# Patient Record
Sex: MALE | Race: WHITE | NOT HISPANIC OR LATINO | Employment: FULL TIME | URBAN - METROPOLITAN AREA
[De-identification: names, ages, dates, MRNs, and addresses within clinical notes are randomized per-mention and may not be internally consistent; named-entity substitution may affect disease eponyms.]

---

## 2019-04-05 ENCOUNTER — OFFICE VISIT (OUTPATIENT)
Dept: URGENT CARE | Facility: PHYSICIAN GROUP | Age: 24
End: 2019-04-05
Payer: COMMERCIAL

## 2019-04-05 VITALS
WEIGHT: 270 LBS | RESPIRATION RATE: 15 BRPM | DIASTOLIC BLOOD PRESSURE: 82 MMHG | HEART RATE: 122 BPM | TEMPERATURE: 98.9 F | SYSTOLIC BLOOD PRESSURE: 134 MMHG | BODY MASS INDEX: 36.57 KG/M2 | OXYGEN SATURATION: 94 % | HEIGHT: 72 IN

## 2019-04-05 DIAGNOSIS — R52 BODY ACHES: ICD-10-CM

## 2019-04-05 DIAGNOSIS — M54.5 LOW BACK PAIN, UNSPECIFIED BACK PAIN LATERALITY, UNSPECIFIED CHRONICITY, WITH SCIATICA PRESENCE UNSPECIFIED: ICD-10-CM

## 2019-04-05 LAB
APPEARANCE UR: CLEAR
BILIRUB UR STRIP-MCNC: NEGATIVE MG/DL
COLOR UR AUTO: YELLOW
FLUAV+FLUBV AG SPEC QL IA: NEGATIVE
GLUCOSE UR STRIP.AUTO-MCNC: NEGATIVE MG/DL
INT CON NEG: NEGATIVE
INT CON POS: POSITIVE
KETONES UR STRIP.AUTO-MCNC: NEGATIVE MG/DL
LEUKOCYTE ESTERASE UR QL STRIP.AUTO: NEGATIVE
NITRITE UR QL STRIP.AUTO: NEGATIVE
PH UR STRIP.AUTO: 5.5 [PH] (ref 5–8)
PROT UR QL STRIP: NORMAL MG/DL
RBC UR QL AUTO: NORMAL
SP GR UR STRIP.AUTO: 1.02
UROBILINOGEN UR STRIP-MCNC: 1 MG/DL

## 2019-04-05 PROCEDURE — 87804 INFLUENZA ASSAY W/OPTIC: CPT | Performed by: PHYSICIAN ASSISTANT

## 2019-04-05 PROCEDURE — 99203 OFFICE O/P NEW LOW 30 MIN: CPT | Performed by: PHYSICIAN ASSISTANT

## 2019-04-05 PROCEDURE — 81002 URINALYSIS NONAUTO W/O SCOPE: CPT | Performed by: PHYSICIAN ASSISTANT

## 2019-04-05 ASSESSMENT — ENCOUNTER SYMPTOMS
FALLS: 0
COUGH: 0
BACK PAIN: 1
CHANGE IN BOWEL HABIT: 0
ABDOMINAL PAIN: 1
WHEEZING: 0
MYALGIAS: 1
VOMITING: 0
HEADACHES: 1
NAUSEA: 1
DIARRHEA: 0
EYE DISCHARGE: 0
FLANK PAIN: 1
CHILLS: 1
FEVER: 0
EYE REDNESS: 0
SORE THROAT: 0
FATIGUE: 1

## 2019-04-05 NOTE — PROGRESS NOTES
Subjective:      Eyad Weber is a 24 y.o. male who presents with Other (knot in stomach that has moved to back, headache , chillis started yesterday )             Patient is a pleasant 24-year-old male who presents to urgent care with a symptoms of lower back pain, body aches, recent headache, fatigue for the last day.  Patient reports yesterday he developed right-sided rib and upper abdominal pain with his back pain however this since has resolved.  Patient reports taking Excedrin with mild relief of his headache.  He denies any fevers but does report body aches.      Other    This is a new problem. The current episode started yesterday. The problem occurs constantly. The problem has been waxing and waning. Associated symptoms include abdominal pain, chills, fatigue, headaches, myalgias and nausea. Pertinent negatives include no change in bowel habit, congestion, coughing, fever, rash, sore throat or vomiting.  Nothing aggravates the symptoms. Treatments tried: As above.  The treatment provided mild relief.   Denies any fall, trauma or injury.    Review of Systems   Constitutional: Positive for chills, fatigue and malaise/fatigue. Negative for fever.   HENT: Negative for congestion and sore throat.    Eyes: Negative for discharge and redness.   Respiratory: Negative for cough and wheezing.    Gastrointestinal: Positive for abdominal pain and nausea. Negative for change in bowel habit, diarrhea and vomiting.   Genitourinary: Positive for flank pain. Negative for dysuria, frequency, hematuria and urgency.   Musculoskeletal: Positive for back pain and myalgias. Negative for falls.   Skin: Negative for rash.   Neurological: Positive for headaches.          Objective:     /82   Pulse (!) 122   Temp 37.2 °C (98.9 °F) (Temporal)   Resp 15   Ht 1.829 m (6')   Wt 122.5 kg (270 lb)   SpO2 94%   BMI 36.62 kg/m²     PMH:  has no past medical history on file.  MEDS: No current outpatient prescriptions on  file.  ALLERGIES: No Known Allergies  SURGHX: No past surgical history on file.  SOCHX:  reports that he has never smoked. He has never used smokeless tobacco. He reports that he does not drink alcohol or use drugs.  FH: Family history was reviewed, no pertinent findings to report    Physical Exam   Constitutional: He is oriented to person, place, and time. He appears well-developed and well-nourished. No distress.   HENT:   Head: Normocephalic and atraumatic.   Right Ear: External ear normal.   Left Ear: External ear normal.   Mouth/Throat: Oropharynx is clear and moist. No oropharyngeal exudate.   Eyes: Pupils are equal, round, and reactive to light. Conjunctivae and EOM are normal.   Neck: Normal range of motion. Neck supple. No tracheal deviation present.   Cardiovascular: Normal rate and regular rhythm.    No murmur heard.  Slower on examination today.   Pulmonary/Chest: Effort normal and breath sounds normal. No respiratory distress.   Abdominal: Soft. Bowel sounds are normal. There is no tenderness.   Musculoskeletal: He exhibits no edema.        Lumbar back: He exhibits tenderness. He exhibits normal range of motion, no bony tenderness and no swelling.        Back:    Neurological: He is alert and oriented to person, place, and time. Coordination normal.   Skin: Skin is warm. No rash noted.   Psychiatric: He has a normal mood and affect. His behavior is normal. Judgment and thought content normal.   Vitals reviewed.            Negative influenza  UA- trace blood.     Assessment/Plan:     1. Body aches  - POCT Influenza A/B  - POCT Urinalysis    2. Low back pain, unspecified back pain laterality, unspecified chronicity, with sciatica presence unspecified  - POCT Urinalysis    No Source of bacterial  infection on examination today.  Encourage patient to utilize NSAIDs, heating pad and increase fluids.  Encouraged light stretching.  Monitor symptoms.     Patient given precautionary s/sx that mandate immediate  follow up and evaluation in the ED. Advised of risks of not doing so.    DDX, Supportive care, and indications for immediate follow-up discussed with patient.    Instructed to return to clinic or nearest emergency department if we are not available for any change in condition, further concerns, or worsening of symptoms.    The patient demonstrated a good understanding and agreed with the treatment plan.  Please note that this dictation was created using voice recognition software. I have made every reasonable attempt to correct obvious errors, but I expect that there are errors of grammar and possibly content that I did not discover before finalizing the note.